# Patient Record
Sex: MALE | Race: WHITE | NOT HISPANIC OR LATINO | Employment: OTHER | URBAN - METROPOLITAN AREA
[De-identification: names, ages, dates, MRNs, and addresses within clinical notes are randomized per-mention and may not be internally consistent; named-entity substitution may affect disease eponyms.]

---

## 2019-12-13 ENCOUNTER — HOSPITAL ENCOUNTER (OUTPATIENT)
Facility: HOSPITAL | Age: 59
Setting detail: OBSERVATION
Discharge: LEFT BEFORE DISCHARGE | End: 2019-12-14
Attending: EMERGENCY MEDICINE | Admitting: INTERNAL MEDICINE

## 2019-12-13 ENCOUNTER — APPOINTMENT (OUTPATIENT)
Dept: CT IMAGING | Facility: HOSPITAL | Age: 59
End: 2019-12-13

## 2019-12-13 ENCOUNTER — APPOINTMENT (OUTPATIENT)
Dept: GENERAL RADIOLOGY | Facility: HOSPITAL | Age: 59
End: 2019-12-13

## 2019-12-13 DIAGNOSIS — R06.00 DYSPNEA, UNSPECIFIED TYPE: ICD-10-CM

## 2019-12-13 DIAGNOSIS — R55 SYNCOPE AND COLLAPSE: ICD-10-CM

## 2019-12-13 DIAGNOSIS — F14.10 COCAINE ABUSE (HCC): ICD-10-CM

## 2019-12-13 DIAGNOSIS — R07.9 CHEST PAIN, UNSPECIFIED TYPE: Primary | ICD-10-CM

## 2019-12-13 PROBLEM — I25.10 CORONARY ARTERY DISEASE INVOLVING NATIVE CORONARY ARTERY: Status: ACTIVE | Noted: 2019-12-13

## 2019-12-13 PROBLEM — E78.5 HYPERLIPIDEMIA: Status: ACTIVE | Noted: 2019-12-13

## 2019-12-13 PROBLEM — I50.9 ACUTE CONGESTIVE HEART FAILURE (HCC): Status: ACTIVE | Noted: 2019-12-13

## 2019-12-13 LAB
ALBUMIN SERPL-MCNC: 3.6 G/DL (ref 3.5–5.2)
ALBUMIN/GLOB SERPL: 1.1 G/DL
ALP SERPL-CCNC: 94 U/L (ref 39–117)
ALT SERPL W P-5'-P-CCNC: 34 U/L (ref 1–41)
AMPHET+METHAMPHET UR QL: NEGATIVE
ANION GAP SERPL CALCULATED.3IONS-SCNC: 14.5 MMOL/L (ref 5–15)
AST SERPL-CCNC: 30 U/L (ref 1–40)
BACTERIA UR QL AUTO: NORMAL /HPF
BARBITURATES UR QL SCN: NEGATIVE
BASOPHILS # BLD AUTO: 0.09 10*3/MM3 (ref 0–0.2)
BASOPHILS NFR BLD AUTO: 0.6 % (ref 0–1.5)
BENZODIAZ UR QL SCN: NEGATIVE
BILIRUB SERPL-MCNC: 1 MG/DL (ref 0.2–1.2)
BILIRUB UR QL STRIP: NEGATIVE
BUN BLD-MCNC: 27 MG/DL (ref 6–20)
BUN/CREAT SERPL: 25.2 (ref 7–25)
CALCIUM SPEC-SCNC: 8.8 MG/DL (ref 8.6–10.5)
CANNABINOIDS SERPL QL: NEGATIVE
CHLORIDE SERPL-SCNC: 101 MMOL/L (ref 98–107)
CLARITY UR: CLEAR
CO2 SERPL-SCNC: 24.5 MMOL/L (ref 22–29)
COCAINE UR QL: POSITIVE
COLOR UR: YELLOW
CREAT BLD-MCNC: 1.07 MG/DL (ref 0.76–1.27)
DEPRECATED RDW RBC AUTO: 42.2 FL (ref 37–54)
EOSINOPHIL # BLD AUTO: 0.11 10*3/MM3 (ref 0–0.4)
EOSINOPHIL NFR BLD AUTO: 0.8 % (ref 0.3–6.2)
ERYTHROCYTE [DISTWIDTH] IN BLOOD BY AUTOMATED COUNT: 14.3 % (ref 12.3–15.4)
ETHANOL BLD-MCNC: <10 MG/DL (ref 0–10)
ETHANOL UR QL: <0.01 %
GFR SERPL CREATININE-BSD FRML MDRD: 71 ML/MIN/1.73
GLOBULIN UR ELPH-MCNC: 3.2 GM/DL
GLUCOSE BLD-MCNC: 113 MG/DL (ref 65–99)
GLUCOSE UR STRIP-MCNC: NEGATIVE MG/DL
HCT VFR BLD AUTO: 44.5 % (ref 37.5–51)
HGB BLD-MCNC: 14.5 G/DL (ref 13–17.7)
HGB UR QL STRIP.AUTO: ABNORMAL
HYALINE CASTS UR QL AUTO: NORMAL /LPF
IMM GRANULOCYTES # BLD AUTO: 0.06 10*3/MM3 (ref 0–0.05)
IMM GRANULOCYTES NFR BLD AUTO: 0.4 % (ref 0–0.5)
INR PPP: 1.18 (ref 0.9–1.1)
KETONES UR QL STRIP: NEGATIVE
LEUKOCYTE ESTERASE UR QL STRIP.AUTO: NEGATIVE
LYMPHOCYTES # BLD AUTO: 2.39 10*3/MM3 (ref 0.7–3.1)
LYMPHOCYTES NFR BLD AUTO: 17.1 % (ref 19.6–45.3)
MAGNESIUM SERPL-MCNC: 2.1 MG/DL (ref 1.6–2.6)
MCH RBC QN AUTO: 27 PG (ref 26.6–33)
MCHC RBC AUTO-ENTMCNC: 32.6 G/DL (ref 31.5–35.7)
MCV RBC AUTO: 82.7 FL (ref 79–97)
METHADONE UR QL SCN: NEGATIVE
MONOCYTES # BLD AUTO: 1.41 10*3/MM3 (ref 0.1–0.9)
MONOCYTES NFR BLD AUTO: 10.1 % (ref 5–12)
NEUTROPHILS # BLD AUTO: 9.89 10*3/MM3 (ref 1.7–7)
NEUTROPHILS NFR BLD AUTO: 71 % (ref 42.7–76)
NITRITE UR QL STRIP: NEGATIVE
NRBC BLD AUTO-RTO: 0 /100 WBC (ref 0–0.2)
NT-PROBNP SERPL-MCNC: 5764 PG/ML (ref 5–900)
OPIATES UR QL: NEGATIVE
OXYCODONE UR QL SCN: NEGATIVE
PH UR STRIP.AUTO: 5.5 [PH] (ref 5–8)
PLATELET # BLD AUTO: 273 10*3/MM3 (ref 140–450)
PMV BLD AUTO: 11.2 FL (ref 6–12)
POTASSIUM BLD-SCNC: 3.7 MMOL/L (ref 3.5–5.2)
PROT SERPL-MCNC: 6.8 G/DL (ref 6–8.5)
PROT UR QL STRIP: ABNORMAL
PROTHROMBIN TIME: 14.7 SECONDS (ref 11.7–14.2)
RBC # BLD AUTO: 5.38 10*6/MM3 (ref 4.14–5.8)
RBC # UR: NORMAL /HPF
REF LAB TEST METHOD: NORMAL
SODIUM BLD-SCNC: 140 MMOL/L (ref 136–145)
SP GR UR STRIP: 1.02 (ref 1–1.03)
SQUAMOUS #/AREA URNS HPF: NORMAL /HPF
TROPONIN T SERPL-MCNC: <0.01 NG/ML (ref 0–0.03)
UROBILINOGEN UR QL STRIP: ABNORMAL
WBC NRBC COR # BLD: 13.95 10*3/MM3 (ref 3.4–10.8)
WBC UR QL AUTO: NORMAL /HPF

## 2019-12-13 PROCEDURE — 83735 ASSAY OF MAGNESIUM: CPT | Performed by: EMERGENCY MEDICINE

## 2019-12-13 PROCEDURE — 80307 DRUG TEST PRSMV CHEM ANLYZR: CPT | Performed by: EMERGENCY MEDICINE

## 2019-12-13 PROCEDURE — 84484 ASSAY OF TROPONIN QUANT: CPT | Performed by: INTERNAL MEDICINE

## 2019-12-13 PROCEDURE — G0378 HOSPITAL OBSERVATION PER HR: HCPCS

## 2019-12-13 PROCEDURE — 96375 TX/PRO/DX INJ NEW DRUG ADDON: CPT

## 2019-12-13 PROCEDURE — 71045 X-RAY EXAM CHEST 1 VIEW: CPT

## 2019-12-13 PROCEDURE — 81003 URINALYSIS AUTO W/O SCOPE: CPT | Performed by: EMERGENCY MEDICINE

## 2019-12-13 PROCEDURE — 80053 COMPREHEN METABOLIC PANEL: CPT | Performed by: EMERGENCY MEDICINE

## 2019-12-13 PROCEDURE — 96374 THER/PROPH/DIAG INJ IV PUSH: CPT

## 2019-12-13 PROCEDURE — 85379 FIBRIN DEGRADATION QUANT: CPT | Performed by: INTERNAL MEDICINE

## 2019-12-13 PROCEDURE — 84443 ASSAY THYROID STIM HORMONE: CPT | Performed by: INTERNAL MEDICINE

## 2019-12-13 PROCEDURE — 84550 ASSAY OF BLOOD/URIC ACID: CPT | Performed by: INTERNAL MEDICINE

## 2019-12-13 PROCEDURE — 25010000002 FUROSEMIDE PER 20 MG: Performed by: EMERGENCY MEDICINE

## 2019-12-13 PROCEDURE — 81015 MICROSCOPIC EXAM OF URINE: CPT | Performed by: EMERGENCY MEDICINE

## 2019-12-13 PROCEDURE — 83036 HEMOGLOBIN GLYCOSYLATED A1C: CPT | Performed by: INTERNAL MEDICINE

## 2019-12-13 PROCEDURE — 25010000002 LORAZEPAM PER 2 MG: Performed by: EMERGENCY MEDICINE

## 2019-12-13 PROCEDURE — 83880 ASSAY OF NATRIURETIC PEPTIDE: CPT | Performed by: EMERGENCY MEDICINE

## 2019-12-13 PROCEDURE — 93005 ELECTROCARDIOGRAM TRACING: CPT | Performed by: EMERGENCY MEDICINE

## 2019-12-13 PROCEDURE — 93010 ELECTROCARDIOGRAM REPORT: CPT | Performed by: INTERNAL MEDICINE

## 2019-12-13 PROCEDURE — 99285 EMERGENCY DEPT VISIT HI MDM: CPT

## 2019-12-13 PROCEDURE — 70450 CT HEAD/BRAIN W/O DYE: CPT

## 2019-12-13 PROCEDURE — 25010000002 HYDROMORPHONE PER 4 MG: Performed by: EMERGENCY MEDICINE

## 2019-12-13 PROCEDURE — 85610 PROTHROMBIN TIME: CPT | Performed by: EMERGENCY MEDICINE

## 2019-12-13 PROCEDURE — 84484 ASSAY OF TROPONIN QUANT: CPT | Performed by: EMERGENCY MEDICINE

## 2019-12-13 PROCEDURE — 70486 CT MAXILLOFACIAL W/O DYE: CPT

## 2019-12-13 PROCEDURE — 81001 URINALYSIS AUTO W/SCOPE: CPT

## 2019-12-13 PROCEDURE — 85025 COMPLETE CBC W/AUTO DIFF WBC: CPT | Performed by: EMERGENCY MEDICINE

## 2019-12-13 PROCEDURE — 25010000002 ONDANSETRON PER 1 MG: Performed by: EMERGENCY MEDICINE

## 2019-12-13 RX ORDER — ONDANSETRON 4 MG/1
4 TABLET, FILM COATED ORAL EVERY 6 HOURS PRN
Status: DISCONTINUED | OUTPATIENT
Start: 2019-12-13 | End: 2019-12-14 | Stop reason: HOSPADM

## 2019-12-13 RX ORDER — FUROSEMIDE 40 MG/1
40 TABLET ORAL DAILY
COMMUNITY

## 2019-12-13 RX ORDER — ONDANSETRON 2 MG/ML
4 INJECTION INTRAMUSCULAR; INTRAVENOUS EVERY 6 HOURS PRN
Status: DISCONTINUED | OUTPATIENT
Start: 2019-12-13 | End: 2019-12-14 | Stop reason: HOSPADM

## 2019-12-13 RX ORDER — ASPIRIN 81 MG/1
81 TABLET ORAL DAILY
Status: DISCONTINUED | OUTPATIENT
Start: 2019-12-14 | End: 2019-12-14 | Stop reason: HOSPADM

## 2019-12-13 RX ORDER — SODIUM CHLORIDE 0.9 % (FLUSH) 0.9 %
10 SYRINGE (ML) INJECTION AS NEEDED
Status: DISCONTINUED | OUTPATIENT
Start: 2019-12-13 | End: 2019-12-14 | Stop reason: HOSPADM

## 2019-12-13 RX ORDER — ACETAMINOPHEN 160 MG/5ML
650 SOLUTION ORAL EVERY 4 HOURS PRN
Status: DISCONTINUED | OUTPATIENT
Start: 2019-12-13 | End: 2019-12-14 | Stop reason: HOSPADM

## 2019-12-13 RX ORDER — CHOLECALCIFEROL (VITAMIN D3) 125 MCG
5 CAPSULE ORAL NIGHTLY PRN
Status: DISCONTINUED | OUTPATIENT
Start: 2019-12-13 | End: 2019-12-14 | Stop reason: HOSPADM

## 2019-12-13 RX ORDER — HYDROMORPHONE HYDROCHLORIDE 1 MG/ML
0.5 INJECTION, SOLUTION INTRAMUSCULAR; INTRAVENOUS; SUBCUTANEOUS ONCE
Status: COMPLETED | OUTPATIENT
Start: 2019-12-13 | End: 2019-12-13

## 2019-12-13 RX ORDER — NITROGLYCERIN 0.4 MG/1
0.4 TABLET SUBLINGUAL
Status: DISCONTINUED | OUTPATIENT
Start: 2019-12-13 | End: 2019-12-14 | Stop reason: HOSPADM

## 2019-12-13 RX ORDER — ACETAMINOPHEN 650 MG/1
650 SUPPOSITORY RECTAL EVERY 4 HOURS PRN
Status: DISCONTINUED | OUTPATIENT
Start: 2019-12-13 | End: 2019-12-14 | Stop reason: HOSPADM

## 2019-12-13 RX ORDER — LORAZEPAM 2 MG/ML
1 INJECTION INTRAMUSCULAR ONCE
Status: COMPLETED | OUTPATIENT
Start: 2019-12-13 | End: 2019-12-13

## 2019-12-13 RX ORDER — ACETAMINOPHEN 325 MG/1
650 TABLET ORAL EVERY 4 HOURS PRN
Status: DISCONTINUED | OUTPATIENT
Start: 2019-12-13 | End: 2019-12-14 | Stop reason: HOSPADM

## 2019-12-13 RX ORDER — NITROGLYCERIN 0.4 MG/1
0.4 TABLET SUBLINGUAL
COMMUNITY

## 2019-12-13 RX ORDER — FUROSEMIDE 10 MG/ML
40 INJECTION INTRAMUSCULAR; INTRAVENOUS EVERY 12 HOURS
Status: DISCONTINUED | OUTPATIENT
Start: 2019-12-14 | End: 2019-12-14 | Stop reason: HOSPADM

## 2019-12-13 RX ORDER — LORAZEPAM 1 MG/1
1 TABLET ORAL ONCE AS NEEDED
Status: COMPLETED | OUTPATIENT
Start: 2019-12-13 | End: 2019-12-14

## 2019-12-13 RX ORDER — FUROSEMIDE 10 MG/ML
80 INJECTION INTRAMUSCULAR; INTRAVENOUS ONCE
Status: COMPLETED | OUTPATIENT
Start: 2019-12-13 | End: 2019-12-13

## 2019-12-13 RX ORDER — ONDANSETRON 2 MG/ML
4 INJECTION INTRAMUSCULAR; INTRAVENOUS ONCE
Status: COMPLETED | OUTPATIENT
Start: 2019-12-13 | End: 2019-12-13

## 2019-12-13 RX ORDER — ASPIRIN 325 MG
325 TABLET, DELAYED RELEASE (ENTERIC COATED) ORAL ONCE
Status: COMPLETED | OUTPATIENT
Start: 2019-12-14 | End: 2019-12-14

## 2019-12-13 RX ORDER — BUDESONIDE AND FORMOTEROL FUMARATE DIHYDRATE 160; 4.5 UG/1; UG/1
2 AEROSOL RESPIRATORY (INHALATION)
COMMUNITY

## 2019-12-13 RX ORDER — CALCIUM CARBONATE 200(500)MG
2 TABLET,CHEWABLE ORAL 2 TIMES DAILY PRN
Status: DISCONTINUED | OUTPATIENT
Start: 2019-12-13 | End: 2019-12-14 | Stop reason: HOSPADM

## 2019-12-13 RX ORDER — BISACODYL 5 MG/1
5 TABLET, DELAYED RELEASE ORAL DAILY PRN
Status: DISCONTINUED | OUTPATIENT
Start: 2019-12-13 | End: 2019-12-14 | Stop reason: HOSPADM

## 2019-12-13 RX ORDER — SODIUM CHLORIDE 0.9 % (FLUSH) 0.9 %
10 SYRINGE (ML) INJECTION EVERY 12 HOURS SCHEDULED
Status: DISCONTINUED | OUTPATIENT
Start: 2019-12-14 | End: 2019-12-14 | Stop reason: HOSPADM

## 2019-12-13 RX ADMIN — METOPROLOL TARTRATE 5 MG: 5 INJECTION, SOLUTION INTRAVENOUS at 20:06

## 2019-12-13 RX ADMIN — ONDANSETRON 4 MG: 2 INJECTION INTRAMUSCULAR; INTRAVENOUS at 21:50

## 2019-12-13 RX ADMIN — HYDROMORPHONE HYDROCHLORIDE 0.5 MG: 1 INJECTION, SOLUTION INTRAMUSCULAR; INTRAVENOUS; SUBCUTANEOUS at 21:52

## 2019-12-13 RX ADMIN — LORAZEPAM 1 MG: 2 INJECTION INTRAMUSCULAR; INTRAVENOUS at 21:53

## 2019-12-13 RX ADMIN — FUROSEMIDE 80 MG: 20 INJECTION, SOLUTION INTRAMUSCULAR; INTRAVENOUS at 19:31

## 2019-12-14 ENCOUNTER — APPOINTMENT (OUTPATIENT)
Dept: CARDIOLOGY | Facility: HOSPITAL | Age: 59
End: 2019-12-14

## 2019-12-14 VITALS
TEMPERATURE: 97.7 F | HEIGHT: 69 IN | DIASTOLIC BLOOD PRESSURE: 77 MMHG | WEIGHT: 213 LBS | OXYGEN SATURATION: 97 % | RESPIRATION RATE: 16 BRPM | BODY MASS INDEX: 31.55 KG/M2 | SYSTOLIC BLOOD PRESSURE: 103 MMHG | HEART RATE: 97 BPM

## 2019-12-14 PROBLEM — J44.9 COPD (CHRONIC OBSTRUCTIVE PULMONARY DISEASE) (HCC): Status: ACTIVE | Noted: 2019-12-14

## 2019-12-14 LAB
ANION GAP SERPL CALCULATED.3IONS-SCNC: 17 MMOL/L (ref 5–15)
BASOPHILS # BLD AUTO: 0.09 10*3/MM3 (ref 0–0.2)
BASOPHILS NFR BLD AUTO: 0.7 % (ref 0–1.5)
BH CV ECHO MEAS - ACS: 1.9 CM
BH CV ECHO MEAS - AO MAX PG (FULL): 1.7 MMHG
BH CV ECHO MEAS - AO MAX PG: 3.1 MMHG
BH CV ECHO MEAS - AO MEAN PG (FULL): 0 MMHG
BH CV ECHO MEAS - AO MEAN PG: 1 MMHG
BH CV ECHO MEAS - AO ROOT AREA (BSA CORRECTED): 1.8
BH CV ECHO MEAS - AO ROOT AREA: 11.9 CM^2
BH CV ECHO MEAS - AO ROOT DIAM: 3.9 CM
BH CV ECHO MEAS - AO V2 MAX: 88.3 CM/SEC
BH CV ECHO MEAS - AO V2 MEAN: 53.8 CM/SEC
BH CV ECHO MEAS - AO V2 VTI: 13 CM
BH CV ECHO MEAS - AVA(I,A): 2.2 CM^2
BH CV ECHO MEAS - AVA(I,D): 2.2 CM^2
BH CV ECHO MEAS - AVA(V,A): 2.3 CM^2
BH CV ECHO MEAS - AVA(V,D): 2.3 CM^2
BH CV ECHO MEAS - BSA(HAYCOCK): 2.2 M^2
BH CV ECHO MEAS - BSA: 2.1 M^2
BH CV ECHO MEAS - BZI_BMI: 31.5 KILOGRAMS/M^2
BH CV ECHO MEAS - BZI_METRIC_HEIGHT: 175.3 CM
BH CV ECHO MEAS - BZI_METRIC_WEIGHT: 96.6 KG
BH CV ECHO MEAS - EDV(CUBED): 175.6 ML
BH CV ECHO MEAS - EDV(MOD-SP2): 208 ML
BH CV ECHO MEAS - EDV(MOD-SP4): 198 ML
BH CV ECHO MEAS - EDV(TEICH): 153.7 ML
BH CV ECHO MEAS - EF(CUBED): 15.2 %
BH CV ECHO MEAS - EF(MOD-BP): 10 %
BH CV ECHO MEAS - EF(MOD-SP2): 4.3 %
BH CV ECHO MEAS - EF(MOD-SP4): 12.1 %
BH CV ECHO MEAS - EF(TEICH): 11.9 %
BH CV ECHO MEAS - ESV(CUBED): 148.9 ML
BH CV ECHO MEAS - ESV(MOD-SP2): 199 ML
BH CV ECHO MEAS - ESV(MOD-SP4): 174 ML
BH CV ECHO MEAS - ESV(TEICH): 135.3 ML
BH CV ECHO MEAS - FS: 5.4 %
BH CV ECHO MEAS - IVS/LVPW: 0.92
BH CV ECHO MEAS - IVSD: 1.1 CM
BH CV ECHO MEAS - LAT PEAK E' VEL: 12 CM/SEC
BH CV ECHO MEAS - LV DIASTOLIC VOL/BSA (35-75): 93.3 ML/M^2
BH CV ECHO MEAS - LV MASS(C)D: 264.7 GRAMS
BH CV ECHO MEAS - LV MASS(C)DI: 124.8 GRAMS/M^2
BH CV ECHO MEAS - LV MAX PG: 1.4 MMHG
BH CV ECHO MEAS - LV MEAN PG: 1 MMHG
BH CV ECHO MEAS - LV SYSTOLIC VOL/BSA (12-30): 82 ML/M^2
BH CV ECHO MEAS - LV V1 MAX: 59.7 CM/SEC
BH CV ECHO MEAS - LV V1 MEAN: 34.6 CM/SEC
BH CV ECHO MEAS - LV V1 VTI: 8.1 CM
BH CV ECHO MEAS - LVIDD: 5.6 CM
BH CV ECHO MEAS - LVIDS: 5.3 CM
BH CV ECHO MEAS - LVLD AP2: 9.3 CM
BH CV ECHO MEAS - LVLD AP4: 8.7 CM
BH CV ECHO MEAS - LVLS AP2: 8.8 CM
BH CV ECHO MEAS - LVLS AP4: 8.9 CM
BH CV ECHO MEAS - LVOT AREA (M): 3.5 CM^2
BH CV ECHO MEAS - LVOT AREA: 3.5 CM^2
BH CV ECHO MEAS - LVOT DIAM: 2.1 CM
BH CV ECHO MEAS - LVPWD: 1.2 CM
BH CV ECHO MEAS - MED PEAK E' VEL: 3 CM/SEC
BH CV ECHO MEAS - MV DEC SLOPE: 484 CM/SEC^2
BH CV ECHO MEAS - MV DEC TIME: 0.16 SEC
BH CV ECHO MEAS - MV E MAX VEL: 85.1 CM/SEC
BH CV ECHO MEAS - MV MAX PG: 2.7 MMHG
BH CV ECHO MEAS - MV MEAN PG: 2 MMHG
BH CV ECHO MEAS - MV P1/2T MAX VEL: 81.4 CM/SEC
BH CV ECHO MEAS - MV P1/2T: 49.3 MSEC
BH CV ECHO MEAS - MV V2 MAX: 81.9 CM/SEC
BH CV ECHO MEAS - MV V2 MEAN: 58.6 CM/SEC
BH CV ECHO MEAS - MV V2 VTI: 11.8 CM
BH CV ECHO MEAS - MVA P1/2T LCG: 2.7 CM^2
BH CV ECHO MEAS - MVA(P1/2T): 4.5 CM^2
BH CV ECHO MEAS - MVA(VTI): 2.4 CM^2
BH CV ECHO MEAS - PA ACC TIME: 0.08 SEC
BH CV ECHO MEAS - PA MAX PG (FULL): 1.3 MMHG
BH CV ECHO MEAS - PA MAX PG: 2 MMHG
BH CV ECHO MEAS - PA PR(ACCEL): 44.4 MMHG
BH CV ECHO MEAS - PA V2 MAX: 70.6 CM/SEC
BH CV ECHO MEAS - PULM A REVS DUR: 0.14 SEC
BH CV ECHO MEAS - PULM A REVS VEL: 18.9 CM/SEC
BH CV ECHO MEAS - PULM DIAS VEL: 23 CM/SEC
BH CV ECHO MEAS - PULM S/D: 0.82
BH CV ECHO MEAS - PULM SYS VEL: 18.9 CM/SEC
BH CV ECHO MEAS - PVA(V,A): 4.6 CM^2
BH CV ECHO MEAS - PVA(V,D): 4.6 CM^2
BH CV ECHO MEAS - QP/QS: 1.3
BH CV ECHO MEAS - RAP SYSTOLE: 8 MMHG
BH CV ECHO MEAS - RV MAX PG: 0.74 MMHG
BH CV ECHO MEAS - RV MEAN PG: 0 MMHG
BH CV ECHO MEAS - RV V1 MAX: 42.9 CM/SEC
BH CV ECHO MEAS - RV V1 MEAN: 23 CM/SEC
BH CV ECHO MEAS - RV V1 VTI: 4.8 CM
BH CV ECHO MEAS - RVOT AREA: 7.5 CM^2
BH CV ECHO MEAS - RVOT DIAM: 3.1 CM
BH CV ECHO MEAS - RVSP: 39.4 MMHG
BH CV ECHO MEAS - SI(AO): 73.2 ML/M^2
BH CV ECHO MEAS - SI(CUBED): 12.6 ML/M^2
BH CV ECHO MEAS - SI(LVOT): 13.2 ML/M^2
BH CV ECHO MEAS - SI(MOD-SP2): 4.2 ML/M^2
BH CV ECHO MEAS - SI(MOD-SP4): 11.3 ML/M^2
BH CV ECHO MEAS - SI(TEICH): 8.6 ML/M^2
BH CV ECHO MEAS - SV(AO): 155.3 ML
BH CV ECHO MEAS - SV(CUBED): 26.7 ML
BH CV ECHO MEAS - SV(LVOT): 28 ML
BH CV ECHO MEAS - SV(MOD-SP2): 9 ML
BH CV ECHO MEAS - SV(MOD-SP4): 24 ML
BH CV ECHO MEAS - SV(RVOT): 36.5 ML
BH CV ECHO MEAS - SV(TEICH): 18.3 ML
BH CV ECHO MEAS - TAPSE (>1.6): 1 CM2
BH CV ECHO MEAS - TR MAX VEL: 280 CM/SEC
BH CV ECHO MEASUREMENTS AVERAGE E/E' RATIO: 11.35
BH CV LOWER VASCULAR LEFT COMMON FEMORAL AUGMENT: NORMAL
BH CV LOWER VASCULAR LEFT COMMON FEMORAL COMPETENT: NORMAL
BH CV LOWER VASCULAR LEFT COMMON FEMORAL COMPRESS: NORMAL
BH CV LOWER VASCULAR LEFT COMMON FEMORAL PHASIC: NORMAL
BH CV LOWER VASCULAR LEFT COMMON FEMORAL SPONT: NORMAL
BH CV LOWER VASCULAR LEFT DISTAL FEMORAL COMPRESS: NORMAL
BH CV LOWER VASCULAR LEFT GASTRONEMIUS COMPRESS: NORMAL
BH CV LOWER VASCULAR LEFT GREATER SAPH AK COMPRESS: NORMAL
BH CV LOWER VASCULAR LEFT GREATER SAPH BK COMPRESS: NORMAL
BH CV LOWER VASCULAR LEFT MID FEMORAL AUGMENT: NORMAL
BH CV LOWER VASCULAR LEFT MID FEMORAL COMPETENT: NORMAL
BH CV LOWER VASCULAR LEFT MID FEMORAL COMPRESS: NORMAL
BH CV LOWER VASCULAR LEFT MID FEMORAL PHASIC: NORMAL
BH CV LOWER VASCULAR LEFT MID FEMORAL SPONT: NORMAL
BH CV LOWER VASCULAR LEFT PERONEAL COMPRESS: NORMAL
BH CV LOWER VASCULAR LEFT POPLITEAL AUGMENT: NORMAL
BH CV LOWER VASCULAR LEFT POPLITEAL COMPETENT: NORMAL
BH CV LOWER VASCULAR LEFT POPLITEAL COMPRESS: NORMAL
BH CV LOWER VASCULAR LEFT POPLITEAL PHASIC: NORMAL
BH CV LOWER VASCULAR LEFT POPLITEAL SPONT: NORMAL
BH CV LOWER VASCULAR LEFT POSTERIOR TIBIAL COMPRESS: NORMAL
BH CV LOWER VASCULAR LEFT PROFUNDA FEMORAL COMPRESS: NORMAL
BH CV LOWER VASCULAR LEFT PROXIMAL FEMORAL COMPRESS: NORMAL
BH CV LOWER VASCULAR LEFT SAPHENOFEMORAL JUNCTION COMPRESS: NORMAL
BH CV LOWER VASCULAR RIGHT COMMON FEMORAL AUGMENT: NORMAL
BH CV LOWER VASCULAR RIGHT COMMON FEMORAL COMPETENT: NORMAL
BH CV LOWER VASCULAR RIGHT COMMON FEMORAL COMPRESS: NORMAL
BH CV LOWER VASCULAR RIGHT COMMON FEMORAL PHASIC: NORMAL
BH CV LOWER VASCULAR RIGHT COMMON FEMORAL SPONT: NORMAL
BH CV LOWER VASCULAR RIGHT DISTAL FEMORAL COMPRESS: NORMAL
BH CV LOWER VASCULAR RIGHT GASTRONEMIUS COMPRESS: NORMAL
BH CV LOWER VASCULAR RIGHT GREATER SAPH AK COMPRESS: NORMAL
BH CV LOWER VASCULAR RIGHT GREATER SAPH BK COMPRESS: NORMAL
BH CV LOWER VASCULAR RIGHT MID FEMORAL AUGMENT: NORMAL
BH CV LOWER VASCULAR RIGHT MID FEMORAL COMPETENT: NORMAL
BH CV LOWER VASCULAR RIGHT MID FEMORAL COMPRESS: NORMAL
BH CV LOWER VASCULAR RIGHT MID FEMORAL PHASIC: NORMAL
BH CV LOWER VASCULAR RIGHT MID FEMORAL SPONT: NORMAL
BH CV LOWER VASCULAR RIGHT PERONEAL COMPRESS: NORMAL
BH CV LOWER VASCULAR RIGHT POPLITEAL AUGMENT: NORMAL
BH CV LOWER VASCULAR RIGHT POPLITEAL COMPETENT: NORMAL
BH CV LOWER VASCULAR RIGHT POPLITEAL COMPRESS: NORMAL
BH CV LOWER VASCULAR RIGHT POPLITEAL PHASIC: NORMAL
BH CV LOWER VASCULAR RIGHT POPLITEAL SPONT: NORMAL
BH CV LOWER VASCULAR RIGHT POSTERIOR TIBIAL COMPRESS: NORMAL
BH CV LOWER VASCULAR RIGHT PROFUNDA FEMORAL COMPRESS: NORMAL
BH CV LOWER VASCULAR RIGHT PROXIMAL FEMORAL COMPRESS: NORMAL
BH CV LOWER VASCULAR RIGHT SAPHENOFEMORAL JUNCTION COMPRESS: NORMAL
BH CV VAS BP RIGHT ARM: NORMAL MMHG
BH CV XLRA - RV BASE: 4.5 CM
BH CV XLRA - TDI S': 8 CM/SEC
BUN BLD-MCNC: 33 MG/DL (ref 6–20)
BUN/CREAT SERPL: 26.8 (ref 7–25)
CALCIUM SPEC-SCNC: 9 MG/DL (ref 8.6–10.5)
CHLORIDE SERPL-SCNC: 98 MMOL/L (ref 98–107)
CO2 SERPL-SCNC: 24 MMOL/L (ref 22–29)
CREAT BLD-MCNC: 1.23 MG/DL (ref 0.76–1.27)
D DIMER PPP FEU-MCNC: 2.07 MCGFEU/ML (ref 0–0.49)
DEPRECATED RDW RBC AUTO: 43.3 FL (ref 37–54)
EOSINOPHIL # BLD AUTO: 0.08 10*3/MM3 (ref 0–0.4)
EOSINOPHIL NFR BLD AUTO: 0.6 % (ref 0.3–6.2)
ERYTHROCYTE [DISTWIDTH] IN BLOOD BY AUTOMATED COUNT: 14.6 % (ref 12.3–15.4)
GFR SERPL CREATININE-BSD FRML MDRD: 60 ML/MIN/1.73
GLUCOSE BLD-MCNC: 132 MG/DL (ref 65–99)
HBA1C MFR BLD: 6.2 % (ref 4.8–5.6)
HCT VFR BLD AUTO: 47.5 % (ref 37.5–51)
HGB BLD-MCNC: 15 G/DL (ref 13–17.7)
IMM GRANULOCYTES # BLD AUTO: 0.05 10*3/MM3 (ref 0–0.05)
IMM GRANULOCYTES NFR BLD AUTO: 0.4 % (ref 0–0.5)
LEFT ATRIUM VOLUME INDEX: 25 ML/M2
LYMPHOCYTES # BLD AUTO: 2.86 10*3/MM3 (ref 0.7–3.1)
LYMPHOCYTES NFR BLD AUTO: 21.8 % (ref 19.6–45.3)
MAXIMAL PREDICTED HEART RATE: 162 BPM
MCH RBC QN AUTO: 26.2 PG (ref 26.6–33)
MCHC RBC AUTO-ENTMCNC: 31.6 G/DL (ref 31.5–35.7)
MCV RBC AUTO: 82.9 FL (ref 79–97)
MONOCYTES # BLD AUTO: 1.28 10*3/MM3 (ref 0.1–0.9)
MONOCYTES NFR BLD AUTO: 9.7 % (ref 5–12)
NEUTROPHILS # BLD AUTO: 8.78 10*3/MM3 (ref 1.7–7)
NEUTROPHILS NFR BLD AUTO: 66.8 % (ref 42.7–76)
NRBC BLD AUTO-RTO: 0.1 /100 WBC (ref 0–0.2)
PLATELET # BLD AUTO: 284 10*3/MM3 (ref 140–450)
PMV BLD AUTO: 11.3 FL (ref 6–12)
POTASSIUM BLD-SCNC: 4.2 MMOL/L (ref 3.5–5.2)
RBC # BLD AUTO: 5.73 10*6/MM3 (ref 4.14–5.8)
SODIUM BLD-SCNC: 139 MMOL/L (ref 136–145)
STRESS TARGET HR: 138 BPM
TROPONIN T SERPL-MCNC: 0.01 NG/ML (ref 0–0.03)
TROPONIN T SERPL-MCNC: <0.01 NG/ML (ref 0–0.03)
TROPONIN T SERPL-MCNC: <0.01 NG/ML (ref 0–0.03)
TSH SERPL DL<=0.05 MIU/L-ACNC: 2.8 UIU/ML (ref 0.27–4.2)
URATE SERPL-MCNC: 11.9 MG/DL (ref 3.4–7)
URATE SERPL-MCNC: 12 MG/DL (ref 3.4–7)
WBC NRBC COR # BLD: 13.14 10*3/MM3 (ref 3.4–10.8)

## 2019-12-14 PROCEDURE — 25010000002 FUROSEMIDE PER 20 MG: Performed by: INTERNAL MEDICINE

## 2019-12-14 PROCEDURE — 25010000002 ONDANSETRON PER 1 MG: Performed by: INTERNAL MEDICINE

## 2019-12-14 PROCEDURE — 94640 AIRWAY INHALATION TREATMENT: CPT

## 2019-12-14 PROCEDURE — 93306 TTE W/DOPPLER COMPLETE: CPT

## 2019-12-14 PROCEDURE — G0378 HOSPITAL OBSERVATION PER HR: HCPCS

## 2019-12-14 PROCEDURE — 99203 OFFICE O/P NEW LOW 30 MIN: CPT | Performed by: INTERNAL MEDICINE

## 2019-12-14 PROCEDURE — 25010000002 ENOXAPARIN PER 10 MG: Performed by: INTERNAL MEDICINE

## 2019-12-14 PROCEDURE — 94799 UNLISTED PULMONARY SVC/PX: CPT

## 2019-12-14 PROCEDURE — 93306 TTE W/DOPPLER COMPLETE: CPT | Performed by: INTERNAL MEDICINE

## 2019-12-14 PROCEDURE — 84484 ASSAY OF TROPONIN QUANT: CPT | Performed by: INTERNAL MEDICINE

## 2019-12-14 PROCEDURE — 84550 ASSAY OF BLOOD/URIC ACID: CPT | Performed by: INTERNAL MEDICINE

## 2019-12-14 PROCEDURE — 93970 EXTREMITY STUDY: CPT

## 2019-12-14 PROCEDURE — 25010000002 PERFLUTREN (DEFINITY) 8.476 MG IN SODIUM CHLORIDE 0.9 % 10 ML INJECTION: Performed by: INTERNAL MEDICINE

## 2019-12-14 PROCEDURE — 80048 BASIC METABOLIC PNL TOTAL CA: CPT | Performed by: INTERNAL MEDICINE

## 2019-12-14 PROCEDURE — 85025 COMPLETE CBC W/AUTO DIFF WBC: CPT | Performed by: INTERNAL MEDICINE

## 2019-12-14 PROCEDURE — 96372 THER/PROPH/DIAG INJ SC/IM: CPT

## 2019-12-14 PROCEDURE — 96376 TX/PRO/DX INJ SAME DRUG ADON: CPT

## 2019-12-14 RX ORDER — METOPROLOL SUCCINATE 25 MG/1
12.5 TABLET, EXTENDED RELEASE ORAL
Status: DISCONTINUED | OUTPATIENT
Start: 2019-12-14 | End: 2019-12-14 | Stop reason: HOSPADM

## 2019-12-14 RX ORDER — IPRATROPIUM BROMIDE AND ALBUTEROL SULFATE 2.5; .5 MG/3ML; MG/3ML
3 SOLUTION RESPIRATORY (INHALATION) EVERY 6 HOURS PRN
Status: DISCONTINUED | OUTPATIENT
Start: 2019-12-14 | End: 2019-12-14 | Stop reason: HOSPADM

## 2019-12-14 RX ORDER — DIAPER,BRIEF,INFANT-TODD,DISP
EACH MISCELLANEOUS EVERY 12 HOURS SCHEDULED
Status: DISCONTINUED | OUTPATIENT
Start: 2019-12-14 | End: 2019-12-14

## 2019-12-14 RX ORDER — DIAPER,BRIEF,INFANT-TODD,DISP
EACH MISCELLANEOUS EVERY 12 HOURS PRN
Status: DISCONTINUED | OUTPATIENT
Start: 2019-12-14 | End: 2019-12-14 | Stop reason: HOSPADM

## 2019-12-14 RX ADMIN — LORAZEPAM 1 MG: 1 TABLET ORAL at 02:24

## 2019-12-14 RX ADMIN — ENOXAPARIN SODIUM 40 MG: 40 INJECTION SUBCUTANEOUS at 02:24

## 2019-12-14 RX ADMIN — PERFLUTREN 2 ML: 6.52 INJECTION, SUSPENSION INTRAVENOUS at 10:10

## 2019-12-14 RX ADMIN — ONDANSETRON 4 MG: 2 INJECTION INTRAMUSCULAR; INTRAVENOUS at 02:32

## 2019-12-14 RX ADMIN — SODIUM CHLORIDE, PRESERVATIVE FREE 10 ML: 5 INJECTION INTRAVENOUS at 08:44

## 2019-12-14 RX ADMIN — IPRATROPIUM BROMIDE AND ALBUTEROL SULFATE 3 ML: 2.5; .5 SOLUTION RESPIRATORY (INHALATION) at 12:42

## 2019-12-14 RX ADMIN — SODIUM CHLORIDE, PRESERVATIVE FREE 10 ML: 5 INJECTION INTRAVENOUS at 02:27

## 2019-12-14 RX ADMIN — FUROSEMIDE 40 MG: 40 INJECTION, SOLUTION INTRAMUSCULAR; INTRAVENOUS at 08:43

## 2019-12-14 RX ADMIN — ACETAMINOPHEN 650 MG: 325 TABLET, FILM COATED ORAL at 11:57

## 2019-12-14 RX ADMIN — ASPIRIN 81 MG: 81 TABLET, COATED ORAL at 08:43

## 2019-12-14 RX ADMIN — Medication 5 MG: at 02:24

## 2019-12-14 RX ADMIN — ASPIRIN 325 MG: 325 TABLET, COATED ORAL at 02:24

## 2019-12-14 RX ADMIN — IPRATROPIUM BROMIDE AND ALBUTEROL SULFATE 3 ML: 2.5; .5 SOLUTION RESPIRATORY (INHALATION) at 04:42

## 2019-12-14 RX ADMIN — ACETAMINOPHEN 650 MG: 325 TABLET, FILM COATED ORAL at 02:23

## 2019-12-14 NOTE — PLAN OF CARE
Problem: Fall Risk (Adult)  Goal: Identify Related Risk Factors and Signs and Symptoms  Outcome: Ongoing (interventions implemented as appropriate)  Flowsheets (Taken 12/14/2019 1799)  Related Risk Factors (Fall Risk): age-related changes; gait/mobility problems; history of falls; bladder function altered  Signs and Symptoms (Fall Risk): presence of risk factors

## 2019-12-14 NOTE — NURSING NOTE
Patient has been rude and verbally aggressive to staff. Patient is now wishing to leave AMA. Refuses to sign AMA papers. Spoke with CCP and because patient is ambulatory and alert and oriented he will prepare his own transportation home. Will continue to monitor and assist patient as needed.

## 2019-12-14 NOTE — NURSING NOTE
Patient escorted off unit by charge nurse, Shana. Patient has called family/friend for transportation.

## 2019-12-14 NOTE — ED NOTES
"This RN notified of pt aggressive behavior and becoming agitated with ED staff.      Pt found clothed, sitting on chair in pt room, stating he wants pain medication.  Pt informed he will not be receiving pain medication at this time, and that he needs to be on cardiac monitoring while in ED r/t abnormal EKG rhythm.      Pt refusing to comply with EKG/oxygen monitoring and stating he wants to \"get out of this torture chamber\".  Pt offered to have PIV removed and given AMA paperwork to sign, but pt refuses, stating \"I don't have to sign anything, and you can't make me\".      Pt requesting to walk to bathroom to have BM, informed per this RN that r/t abnormal EKG he must remain on cardiac monitoring and must use bedside commode while in ED.  Pt agitated, initially refused bedside commode but now is agreeable and allowed this RN to place him on cardiac and oxygen monitoring.    Dr Cannon and Gunjan RN notified of pt being agreeable to staying for eval in ED.     Brittney Valdes, RN  12/13/19 2114    "

## 2019-12-14 NOTE — PLAN OF CARE
Problem: Patient Care Overview  Goal: Plan of Care Review  Outcome: Ongoing (interventions implemented as appropriate)  Flowsheets (Taken 12/14/2019 1612)  Progress: improving  Plan of Care Reviewed With: patient  Outcome Summary: Patient has been anxious, irritable, and angry for entire shift. Patient did have echo and doppler completed. Medical records obtained. Possible heart cath on Monday. Patient talking about wanting to leave AMA. Have told him of the risks of doing this in his condition. Receiving diuretics and breathing treatments. Continuously requesting pain medication but will not be prescribed. Patient has been verbally aggressive with myself, Dr. Kc, and Dr. Gillette. Will continue to monitor and assist patient as needed.
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
## 2019-12-14 NOTE — ED NOTES
Pt reports his legs & abdomen are swollen. Today he began having chest pain & SOA.     Flora Watson, RN  12/13/19 1902

## 2019-12-14 NOTE — PROGRESS NOTES
"DAILY PROGRESS NOTE  [facility]    Patient Identification:  Name: Yuan Leslie  Age: 58 y.o.  Sex: male  :  1960  MRN: [id number]         Primary Care Physician: System, Provider Not In    Subjective: patient was sitting up in a chair by the window; demanded to know what I was going to do about his shortness of breath and pain; was not wearing oxygen; took tylenol but didn't help and \"I have tylenol at home\"  Interval History: follow up for cocaine use, cardiomyopathy,      Objective:    Scheduled Meds:  aspirin 81 mg Oral Daily   enoxaparin 40 mg Subcutaneous Q24H   furosemide 40 mg Intravenous Q12H   sodium chloride 10 mL Intravenous Q12H     Continuous Infusions:     Vital signs in last 24 hours:  Temp:  [97.2 °F (36.2 °C)-98.6 °F (37 °C)] 97.7 °F (36.5 °C)  Heart Rate:  [] 97  Resp:  [16-22] 16  BP: ()/(77-93) 103/77    Intake/Output:    Intake/Output Summary (Last 24 hours) at 2019 1617  Last data filed at 2019 0536  Gross per 24 hour   Intake 720 ml   Output 350 ml   Net 370 ml       Exam:  /77 (BP Location: Right arm, Patient Position: Lying)   Pulse 97   Temp 97.7 °F (36.5 °C) (Oral)   Resp 16   Ht 175.3 cm (69\")   Wt 96.6 kg (213 lb)   SpO2 97%   BMI 31.45 kg/m²     General Appearance:    Alert, cooperative, no distress, AAOx3                          Head:    Normocephalic, without obvious abnormality, atraumatic                           Eyes:    PERRL, conjunctiva/corneas clear, EOM's intact, both eyes                         Throat:   Lips, tongue, gums normal; oral mucosa pink and moist                           Neck:   Supple, symmetrical, trachea midline, no JVD                         Lungs:    Decreased breath sounds bilaterally, respirations unlabored                 Chest Wall:    No tenderness or deformity                          Heart:    Regular rate and rhythm, S1 and S2 normal, no murmur,no  Rub                                    "   or gallop                  Abdomen:     Soft, non-tender, bowel sounds active, no masses, no                                                        organomegaly                  Extremities:   Extremities normal, atraumatic, no cyanosis or edema                        Pulses:   Pulses palpable in all extremities                            Skin:   Skin is warm and dry,  no rashes or palpable lesions                  Neurologic:   CNII-XII intact, motor strength grossly intact, sensation grossly                                         intact to light touch, no focal deficits noted       Data Review:  Labs in chart were reviewed.  WBC   Date Value Ref Range Status   12/14/2019 13.14 (H) 3.40 - 10.80 10*3/mm3 Final     Hemoglobin   Date Value Ref Range Status   12/14/2019 15.0 13.0 - 17.7 g/dL Final     Hematocrit   Date Value Ref Range Status   12/14/2019 47.5 37.5 - 51.0 % Final     Platelets   Date Value Ref Range Status   12/14/2019 284 140 - 450 10*3/mm3 Final     Sodium   Date Value Ref Range Status   12/14/2019 139 136 - 145 mmol/L Final     Potassium   Date Value Ref Range Status   12/14/2019 4.2 3.5 - 5.2 mmol/L Final     Chloride   Date Value Ref Range Status   12/14/2019 98 98 - 107 mmol/L Final     CO2   Date Value Ref Range Status   12/14/2019 24.0 22.0 - 29.0 mmol/L Final     BUN   Date Value Ref Range Status   12/14/2019 33 (H) 6 - 20 mg/dL Final     Creatinine   Date Value Ref Range Status   12/14/2019 1.23 0.76 - 1.27 mg/dL Final     Glucose   Date Value Ref Range Status   12/14/2019 132 (H) 65 - 99 mg/dL Final     Calcium   Date Value Ref Range Status   12/14/2019 9.0 8.6 - 10.5 mg/dL Final     Magnesium   Date Value Ref Range Status   12/13/2019 2.1 1.6 - 2.6 mg/dL Final     AST (SGOT)   Date Value Ref Range Status   12/13/2019 30 1 - 40 U/L Final     ALT (SGPT)   Date Value Ref Range Status   12/13/2019 34 1 - 41 U/L Final     Alkaline Phosphatase   Date Value Ref Range Status   12/13/2019 94 39 -  "117 U/L Final     Patient Active Problem List   Diagnosis Code   • Chest pain R07.9   • Acute congestive heart failure (CMS/HCC) I50.9   • Coronary artery disease involving native coronary artery I25.10   • Hyperlipidemia E78.5   • COPD (chronic obstructive pulmonary disease) (CMS/[facility]) J44.9       Assessment:    Chest pain    Acute congestive heart failure (CMS/[facility])    Coronary artery disease involving native coronary artery    Hyperlipidemia    COPD (chronic obstructive pulmonary disease) (CMS/[facility])      Plan:  Discussed with him that opioids would not be prescribed for him  He started yelling and said he was leaving  Called me a \"junky bitch\" as I was exiting the room  D.w staff earlier  Patient is quite noncompliant and has a severe cardiomyopathy with an ef of 10% per echo      Cindi Gillette MD  12/14/2019  4:17 PM    "
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
## 2019-12-14 NOTE — PROGRESS NOTES
"Entered room, identified self, explained role, and verified facesheet information.  Presented patient with \"[facility]'s Preference To Transfer\" form and explained form's purpose; patient verbalized understanding but refused to sign, indicating \"[facility]'t [facility] with [facility].\"  Form documented as \"Refusal To Sign.\"  Fax sent to [facility]; confirmation page received.  Ricardo Adams RN    "

## 2019-12-14 NOTE — PLAN OF CARE
Problem: Patient Care Overview  Goal: Plan of Care Review  12/14/2019 0505 by Brittney Encinas RN  Flowsheets (Taken 12/14/2019 7624)  Outcome Summary: Patient admitted from ED for chest pain/ CHF. Vital signs WDL. 02@2L NC prn. Fall precautions. Up with assist x 1. Cardiology consulted to see patient this AM. Patient has been very hostile towards staff. He has yelled and cursed at myself and the nurse's aid Marisa since his arrival to the unit. He is noncompliant with any care. He keeps wanting to get out of the bed on his own after being educated on the risks of falls due to his previous fall at home. I have spent over an hour in his room on 2 separate occasions trying to educate him on his diagnosis and care of plan. Also, I have tried to calm him down and reason with him over his frustations. He only continued to be hostile and angry. Will cont to monitor

## 2019-12-15 NOTE — DISCHARGE SUMMARY
PHYSICIAN DISCHARGE SUMMARY                                                                        [facility]    Patient Identification:  Name: Yuan Leslie  Age: 58 y.o.  Sex: male  :  1960  MRN: [id number]  Primary Care Physician: System, Provider Not In    Admit date: 2019  Discharge date and time: 19 left against medical advice    Discharged Condition: unchanged    Discharge Diagnoses:  Chest pain    Acute congestive heart failure (CMS/[facility])    Coronary artery disease involving native coronary artery    Hyperlipidemia    COPD (chronic obstructive pulmonary disease) (CMS/[facility])     Patient Active Problem List   Diagnosis Code   • Chest pain R07.9   • Acute congestive heart failure (CMS/HCC) I50.9   • Coronary artery disease involving native coronary artery I25.10   • Hyperlipidemia E78.5   • COPD (chronic obstructive pulmonary disease) (CMS/[facility]) J44.9       PMHX:   Past Medical History:   Diagnosis Date   • Coronary artery disease    • Hyperlipidemia      PSHX: History reviewed. No pertinent surgical history.    Hospital Course: Yuan Leslie  Was admitted with chest pain; he tested positive for cocaine in the ED but denied actively using it;  He  Had an echo which showed an ef of 10%; he was seen by cardiology and a cardiac cath was recommended; the patient was demanding and asking how his pain and shortness of breath were going to be treated; he refused to wear oxygen stating that it did not help and also did not want to take tylenol offered which he has at home; he had left against medical advice from the [facility] recently and refused to consider transfer back there; he became more agitated once told that he would not be receiving narcotics and left against medical advice uttering profanities    Consults:     Consults     Date and Time Order Name Status Description    2019 2332 Inpatient Cardiology  Consult Completed     12/13/2019 2204 [facility] (on-call MD unless specified) Details Completed     12/13/2019 2145 [facility] (on-call MD unless specified) Completed         Results from last 7 days   Lab Units 12/14/19  0520   WBC 10*3/mm3 13.14*   HEMOGLOBIN g/dL 15.0   HEMATOCRIT % 47.5   PLATELETS 10*3/mm3 284     Results from last 7 days   Lab Units 12/14/19  0520   SODIUM mmol/L 139   POTASSIUM mmol/L 4.2   CHLORIDE mmol/L 98   CO2 mmol/L 24.0   BUN mg/dL 33*   CREATININE mg/dL 1.23   GLUCOSE mg/dL 132*   CALCIUM mg/dL 9.0     Significant Diagnostic Studies: Labs in chart were reviewed.  Imaging Results (All)     Procedure Component Value Units Date/Time    CT Head Without Contrast [[id number]] Collected:  12/13/19 2039     Updated:  12/13/19 2047    Narrative:       HEAD AND FACE CT WITHOUT CONTRAST     HISTORY: Fall today. Contusion to the nasal region.     TECHNIQUE: Axial CT scans of the head and face with multiplanar  reformatted images is provided.     Radiation dose reduction techniques were utilized, including automated  exposure control and exposure modulation based on body size.     FINDINGS HEAD CT: The ventricles are normal in caliber. There is some  patchy low density in the periventricular white matter. There is no  hemorrhage or ischemic change. Extra-axial spaces appear normal. The  bones of the skull are intact.     Images of the face show leftward deviation of the nasal septum and a  jay bullosa on the right. Paranasal sinuses are clear. No facial  fracture is identified. Orbital structures appear normal. The visualized  upper cervical spine appears normal. There is some degenerative change  between the odontoid and the C1 ring anteriorly and in the upper  cervical facets.       Impression:       Small vessel white matter change in the brain. No acute  posttraumatic normality identified in the head or the face.     This report was finalized on 12/13/2019 8:44 PM by Dr. Bjorn Leary M.D.       CT  Facial Bones Without Contrast [[id number]] Collected:  12/13/19 2039     Updated:  12/13/19 2047    Narrative:       HEAD AND FACE CT WITHOUT CONTRAST     HISTORY: Fall today. Contusion to the nasal region.     TECHNIQUE: Axial CT scans of the head and face with multiplanar  reformatted images is provided.     Radiation dose reduction techniques were utilized, including automated  exposure control and exposure modulation based on body size.     FINDINGS HEAD CT: The ventricles are normal in caliber. There is some  patchy low density in the periventricular white matter. There is no  hemorrhage or ischemic change. Extra-axial spaces appear normal. The  bones of the skull are intact.     Images of the face show leftward deviation of the nasal septum and a  jay bullosa on the right. Paranasal sinuses are clear. No facial  fracture is identified. Orbital structures appear normal. The visualized  upper cervical spine appears normal. There is some degenerative change  between the odontoid and the C1 ring anteriorly and in the upper  cervical facets.       Impression:       Small vessel white matter change in the brain. No acute  posttraumatic normality identified in the head or the face.     This report was finalized on 12/13/2019 8:44 PM by Dr. Bjorn Leary M.D.       XR Chest 1 View [[id number]] Collected:  12/13/19 1929     Updated:  12/13/19 1933    Narrative:       PORTABLE CHEST 12/13/2019 AT 7:12 PM     CLINICAL HISTORY: Dyspnea.     The lungs are well-expanded and appear free of acute infiltrates. There  are no pleural effusions. The heart is mildly enlarged. The pulmonary  vasculature is within normal limits.     IMPRESSIONS: Mild cardiomegaly. No acute process is identified within  the chest.     This report was finalized on 12/13/2019 7:30 PM by Dr. Duy Buckley M.D.           /77 (BP Location: Right arm, Patient Position: Lying)   Pulse 97   Temp 97.7 °F (36.5 °C) (Oral)   Resp 16   Ht 175.3  "cm (69\")   Wt 96.6 kg (213 lb)   SpO2 97%   BMI 31.45 kg/m²     Discharge Exam:  General Appearance:    Alert, cooperative, no distress, AAOx3; chronically ill-appearing; agitated                          Head:    Normocephalic, without obvious abnormality, atraumatic                          Eyes:    PERRL, conjunctiva/corneas clear, EOM's intact, both eyes                        Throat:   Lips, tongue, gums normal; oral mucosa pink and moist                          Neck:   Supple, symmetrical, trachea midline, no JVD                        Lungs:     Decreased breath sounds bilaterally, respirations unlabored                Chest Wall:    No tenderness or deformity                        Heart:    Regular rate and rhythm, S1 and S2 normal, no murmur,no  Rub                                       or gallop                  Abdomen:     Soft, non-tender, bowel sounds active, no masses, no                                                        organomegaly                  Extremities:   Extremities normal, atraumatic, no cyanosis or edema, pulses                                           palpable in all extremities                             Skin:   Skin is warm and dry,  no rashes or palpable lesions                  Neurologic:   CNII-XII intact, motor strength grossly intact, sensation grossly                                           intact to light touch, no focal deficits noted     Disposition:  left against medical advice    Patient Instructions:      Discharge Medications      ASK your doctor about these medications      Instructions Start Date   budesonide-formoterol 160-4.5 MCG/ACT inhaler  Commonly known as:  SYMBICORT   2 puffs, Inhalation, 2 Times Daily - RT      furosemide 40 MG tablet  Commonly known as:  LASIX   40 mg, Oral, Daily      nitroglycerin 0.4 MG SL tablet  Commonly known as:  NITROSTAT   0.4 mg, Sublingual, Every 5 Minutes PRN, Take no more than 3 doses in 15 minutes.             No " future appointments.  Follow-up Information     System, Provider Not In .    Contact information:  [facility] [id number]                 Discharge Order (From admission, onward)    None             Signed:  Cindi Gillette MD  12/14/2019  10:29 PM

## 2019-12-16 NOTE — PROGRESS NOTES
Case Management Discharge Note      Final Note: Per MD notes patient Dc'd home                 Transportation Services  Private: Car    Final Discharge Disposition Code: 01 - home or self-care
